# Patient Record
(demographics unavailable — no encounter records)

---

## 2024-12-11 NOTE — HISTORY OF PRESENT ILLNESS
[FreeTextEntry1] : 85 yo lady with PMHx of prediabetes, HTN and HLD who presents as a new patient  12/11/24 ROS + dyspnea on exertion worsening over the last few months, now feeling dyspneic even after few steps when walking outside  PMHx/PSHx as above FMHx no CV disease Social hx no substance use

## 2024-12-11 NOTE — DISCUSSION/SUMMARY
[FreeTextEntry1] : 85 yo lady with PMHx of prediabetes, HTN and HLD who presents as a new patient  Assessment: 1. HTN  2. HLD 3. Dyspnea on exertion   Plan: 1. Atenolol 25mg PO QD and losartan-HCTZ 50-12.5mg PO QD 2. Rosuvastatin 10mg PO QHS 3. TTE 4. RTC w/ TTE end of Jan   During non face-to-face time, I reviewed relevant portions of the patients medical record. During face-to-face time, I took a relevant history and examined the patient. I also explained differential diagnoses, relevant cardiac diagnoses, workup, and management plan, which required a moderate level of medical decision making. I answered all questions related to the patient's medical conditions.   Shahab RALPH (John)  of Cardiology [facility] at [facility]

## 2025-02-24 NOTE — DISCUSSION/SUMMARY
[FreeTextEntry1] : 83 yo lady with PMHx of prediabetes, HTN and HLD  Assessment: 1. HTN - uncontrolled 2. HLD 3. Dyspnea on exertion   Plan: 1. Atenolol 25mg PO QD and losartan-HCTZ 50-12.5mg PO QD 2. Rosuvastatin 10mg PO QHS 3. TTE done today, will review 4. RTC 3mo   During non face-to-face time, I reviewed relevant portions of the patients medical record. During face-to-face time, I took a relevant history and examined the patient. I also explained differential diagnoses, relevant cardiac diagnoses, workup, and management plan, which required a moderate level of medical decision making. I answered all questions related to the patient's medical conditions.   Shahab RALPH (John)  of Cardiology [facility] at [facility]

## 2025-02-24 NOTE — DISCUSSION/SUMMARY
[FreeTextEntry1] : 85 yo lady with PMHx of prediabetes, HTN and HLD  Assessment: 1. HTN - uncontrolled 2. HLD 3. Dyspnea on exertion   Plan: 1. Atenolol 25mg PO QD and losartan-HCTZ 50-12.5mg PO QD 2. Rosuvastatin 10mg PO QHS 3. TTE done today, will review 4. RTC 3mo   During non face-to-face time, I reviewed relevant portions of the patients medical record. During face-to-face time, I took a relevant history and examined the patient. I also explained differential diagnoses, relevant cardiac diagnoses, workup, and management plan, which required a moderate level of medical decision making. I answered all questions related to the patient's medical conditions.   Shahab RALPH (John)  of Cardiology [facility] at [facility]

## 2025-02-24 NOTE — HISTORY OF PRESENT ILLNESS
[FreeTextEntry1] : 84 yo lady with PMHx of prediabetes, HTN and HLD  02/24/25 Dyspnea as below 12/11/24 ROS + dyspnea on exertion worsening over the last few months, now feeling dyspneic even after few steps when walking outside

## 2025-02-24 NOTE — HISTORY OF PRESENT ILLNESS
[FreeTextEntry1] : 86 yo lady with PMHx of prediabetes, HTN and HLD  02/24/25 Dyspnea as below 12/11/24 ROS + dyspnea on exertion worsening over the last few months, now feeling dyspneic even after few steps when walking outside

## 2025-04-30 NOTE — DISCUSSION/SUMMARY
[FreeTextEntry1] : 86 yo lady with PMHx of prediabetes, HTN, mild MR/TR, and HLD  Assessment: 1. HTN  2. HLD 3. Dyspnea on exertion in setting of uncontrolled HTN   Plan: 1. Atenolol 25mg PO QD and losartan-HCTZ 50-12.5mg PO QD 2. Rosuvastatin 10mg PO QHS 3. RTC 6mo    During non face-to-face time, I reviewed relevant portions of the patients medical record. During face-to-face time, I took a relevant history and examined the patient. I also explained differential diagnoses, relevant cardiac diagnoses, workup, and management plan, which required a moderate level of medical decision making. I answered all questions related to the patient's medical conditions.   Shahab RALPH (John)  of Cardiology [facility] at [facility]

## 2025-04-30 NOTE — HISTORY OF PRESENT ILLNESS
[FreeTextEntry1] : 86 yo lady with PMHx of prediabetes, HTN, mild MR/TR, and HLD  04/30/25 Dyspnea as below 02/24/25 Dyspnea as below 12/11/24 ROS + dyspnea on exertion worsening over the last few months, now feeling dyspneic even after few steps when walking outside

## 2025-04-30 NOTE — DISCUSSION/SUMMARY
[FreeTextEntry1] : 86 yo lady with PMHx of prediabetes, HTN, mild MR/TR, and HLD  Assessment: 1. HTN  2. HLD 3. Dyspnea on exertion in setting of uncontrolled HTN   Plan: 1. Atenolol 25mg PO QD and losartan-HCTZ 50-12.5mg PO QD 2. Rosuvastatin 10mg PO QHS 3. RTC 6mo    During non face-to-face time, I reviewed relevant portions of the patients medical record. During face-to-face time, I took a relevant history and examined the patient. I also explained differential diagnoses, relevant cardiac diagnoses, workup, and management plan, which required a moderate level of medical decision making. I answered all questions related to the patient's medical conditions.   Shahab RALPH (John)  of Cardiology [facility] at [facility]